# Patient Record
Sex: FEMALE | Race: WHITE | NOT HISPANIC OR LATINO | Employment: FULL TIME | URBAN - METROPOLITAN AREA
[De-identification: names, ages, dates, MRNs, and addresses within clinical notes are randomized per-mention and may not be internally consistent; named-entity substitution may affect disease eponyms.]

---

## 2023-06-14 LAB — CHORIOGONADOTROPIN (MIU/ML) IN SER/PLAS: <2 MIU/ML

## 2023-08-10 ENCOUNTER — HOSPITAL ENCOUNTER (OUTPATIENT)
Dept: DATA CONVERSION | Facility: HOSPITAL | Age: 28
Discharge: HOME | End: 2023-08-10

## 2023-08-10 DIAGNOSIS — F17.210 NICOTINE DEPENDENCE, CIGARETTES, UNCOMPLICATED: ICD-10-CM

## 2023-08-10 DIAGNOSIS — I10 ESSENTIAL (PRIMARY) HYPERTENSION: ICD-10-CM

## 2023-08-10 DIAGNOSIS — R11.0 NAUSEA: ICD-10-CM

## 2023-08-10 DIAGNOSIS — R51.9 HEADACHE, UNSPECIFIED: ICD-10-CM

## 2023-08-10 DIAGNOSIS — Z86.69 PERSONAL HISTORY OF OTHER DISEASES OF THE NERVOUS SYSTEM AND SENSE ORGANS: ICD-10-CM

## 2023-11-30 ENCOUNTER — TELEPHONE (OUTPATIENT)
Dept: OBSTETRICS AND GYNECOLOGY | Facility: CLINIC | Age: 28
End: 2023-11-30
Payer: COMMERCIAL

## 2023-11-30 NOTE — TELEPHONE ENCOUNTER
Est pt called into the office stating she was at [facility] ER on Sunday c/o cyst in bikini line-perineal area on the right side. Pt states she was prescribed bactrim x 5 days 2 tabs bid. Pt states she also has a cyst on the left side now and is long and hard. Pt denies pain, drainage, redness or warmth. States experiences stinging when walking but not all the time. Told pt to apply warm compresses to affected area for relief and a message will be sent to the provider for recommendation. Pt aware possible eval needed to access complaint. Please advise.

## 2023-12-02 NOTE — TELEPHONE ENCOUNTER
The cysts improving on the antibiotic?  She should avoid wearing underwear and pants at night to allow for complete resolution.  Can try Epsom salt sitz bath's for bed.  Use chlorhexidine body wash from over-the-counter pharmacist shower daily.  If she has not improved office follow-up.

## 2023-12-04 ENCOUNTER — TELEPHONE (OUTPATIENT)
Dept: ENDOCRINOLOGY | Facility: CLINIC | Age: 28
End: 2023-12-04

## 2023-12-04 NOTE — TELEPHONE ENCOUNTER
"Spoke with pt, she states LT sided cyst, smaller, RT sided cyst is \"almost gone but feels raw\", pt c/o \"soreness\", pt states she she urinates there is a stinging, and discomfort when she wipes, pt aware of above instructions, pt told to follow up in 1 week   "

## 2023-12-05 NOTE — TELEPHONE ENCOUNTER
Per below recommendations, pt was made aware to follow up with our office in 1 wk. U/A not collected at this time.

## 2023-12-11 ENCOUNTER — APPOINTMENT (OUTPATIENT)
Dept: RADIOLOGY | Facility: HOSPITAL | Age: 28
End: 2023-12-11
Payer: COMMERCIAL

## 2024-10-15 ENCOUNTER — TELEPHONE (OUTPATIENT)
Dept: OBSTETRICS AND GYNECOLOGY | Facility: CLINIC | Age: 29
End: 2024-10-15
Payer: COMMERCIAL

## 2024-10-15 NOTE — TELEPHONE ENCOUNTER
Pt has annual appt tomorrow, but called today stating she is having right sided ovarian pain. She stated that she has had to have cyst surgically removed before and it started with this same kind of pain.

## 2024-10-16 ENCOUNTER — APPOINTMENT (OUTPATIENT)
Dept: OBSTETRICS AND GYNECOLOGY | Facility: CLINIC | Age: 29
End: 2024-10-16
Payer: COMMERCIAL

## 2024-10-28 ENCOUNTER — TELEPHONE (OUTPATIENT)
Dept: OBSTETRICS AND GYNECOLOGY | Facility: CLINIC | Age: 29
End: 2024-10-28
Payer: COMMERCIAL

## 2024-10-31 ENCOUNTER — LAB (OUTPATIENT)
Dept: LAB | Facility: LAB | Age: 29
End: 2024-10-31
Payer: COMMERCIAL

## 2024-10-31 ENCOUNTER — OFFICE VISIT (OUTPATIENT)
Dept: OBSTETRICS AND GYNECOLOGY | Facility: CLINIC | Age: 29
End: 2024-10-31
Payer: COMMERCIAL

## 2024-10-31 VITALS
HEIGHT: 66 IN | SYSTOLIC BLOOD PRESSURE: 138 MMHG | WEIGHT: 219.2 LBS | BODY MASS INDEX: 35.23 KG/M2 | DIASTOLIC BLOOD PRESSURE: 72 MMHG

## 2024-10-31 DIAGNOSIS — Z11.3 SCREENING FOR STD (SEXUALLY TRANSMITTED DISEASE): ICD-10-CM

## 2024-10-31 DIAGNOSIS — R82.90 BAD ODOR OF URINE: ICD-10-CM

## 2024-10-31 DIAGNOSIS — N94.6 DYSMENORRHEA: Primary | ICD-10-CM

## 2024-10-31 LAB
POC APPEARANCE, URINE: CLEAR
POC BILIRUBIN, URINE: ABNORMAL
POC BLOOD, URINE: ABNORMAL
POC COLOR, URINE: YELLOW
POC GLUCOSE, URINE: NEGATIVE MG/DL
POC KETONES, URINE: ABNORMAL MG/DL
POC LEUKOCYTES, URINE: ABNORMAL
POC NITRITE,URINE: POSITIVE
POC PH, URINE: 6 PH
POC PROTEIN, URINE: ABNORMAL MG/DL
POC SPECIFIC GRAVITY, URINE: >=1.03
POC UROBILINOGEN, URINE: 1 EU/DL

## 2024-10-31 PROCEDURE — 87186 SC STD MICRODIL/AGAR DIL: CPT | Mod: WESLAB | Performed by: STUDENT IN AN ORGANIZED HEALTH CARE EDUCATION/TRAINING PROGRAM

## 2024-10-31 PROCEDURE — 3008F BODY MASS INDEX DOCD: CPT | Performed by: STUDENT IN AN ORGANIZED HEALTH CARE EDUCATION/TRAINING PROGRAM

## 2024-10-31 PROCEDURE — 87591 N.GONORRHOEAE DNA AMP PROB: CPT | Mod: WESLAB | Performed by: STUDENT IN AN ORGANIZED HEALTH CARE EDUCATION/TRAINING PROGRAM

## 2024-10-31 PROCEDURE — 81003 URINALYSIS AUTO W/O SCOPE: CPT | Performed by: STUDENT IN AN ORGANIZED HEALTH CARE EDUCATION/TRAINING PROGRAM

## 2024-10-31 PROCEDURE — 99213 OFFICE O/P EST LOW 20 MIN: CPT | Performed by: STUDENT IN AN ORGANIZED HEALTH CARE EDUCATION/TRAINING PROGRAM

## 2024-10-31 PROCEDURE — 86803 HEPATITIS C AB TEST: CPT

## 2024-10-31 PROCEDURE — 87389 HIV-1 AG W/HIV-1&-2 AB AG IA: CPT

## 2024-10-31 PROCEDURE — 36415 COLL VENOUS BLD VENIPUNCTURE: CPT

## 2024-10-31 PROCEDURE — 86780 TREPONEMA PALLIDUM: CPT

## 2024-10-31 PROCEDURE — 87661 TRICHOMONAS VAGINALIS AMPLIF: CPT | Mod: WESLAB | Performed by: STUDENT IN AN ORGANIZED HEALTH CARE EDUCATION/TRAINING PROGRAM

## 2024-10-31 ASSESSMENT — PATIENT HEALTH QUESTIONNAIRE - PHQ9
1. LITTLE INTEREST OR PLEASURE IN DOING THINGS: NOT AT ALL
SUM OF ALL RESPONSES TO PHQ9 QUESTIONS 1 AND 2: 0
2. FEELING DOWN, DEPRESSED OR HOPELESS: NOT AT ALL

## 2024-10-31 ASSESSMENT — PAIN SCALES - GENERAL: PAINLEVEL_OUTOF10: 0-NO PAIN

## 2024-10-31 ASSESSMENT — ENCOUNTER SYMPTOMS
OCCASIONAL FEELINGS OF UNSTEADINESS: 0
LOSS OF SENSATION IN FEET: 0
DEPRESSION: 0

## 2024-11-01 DIAGNOSIS — A59.9 TRICHOMONAS VAGINALIS INFECTION: Primary | ICD-10-CM

## 2024-11-01 LAB
C TRACH RRNA SPEC QL NAA+PROBE: NEGATIVE
HCV AB SER QL: NONREACTIVE
HIV 1+2 AB+HIV1 P24 AG SERPL QL IA: NONREACTIVE
N GONORRHOEA DNA SPEC QL PROBE+SIG AMP: NEGATIVE
T VAGINALIS RRNA SPEC QL NAA+PROBE: POSITIVE
TREPONEMA PALLIDUM IGG+IGM AB [PRESENCE] IN SERUM OR PLASMA BY IMMUNOASSAY: NONREACTIVE

## 2024-11-01 RX ORDER — METRONIDAZOLE 500 MG/1
500 TABLET ORAL 2 TIMES DAILY
Qty: 14 TABLET | Refills: 0 | Status: SHIPPED | OUTPATIENT
Start: 2024-11-01 | End: 2024-11-08

## 2024-11-03 LAB — BACTERIA UR CULT: ABNORMAL

## 2024-11-04 DIAGNOSIS — N30.00 ACUTE CYSTITIS WITHOUT HEMATURIA: Primary | ICD-10-CM

## 2024-11-04 RX ORDER — NITROFURANTOIN 25; 75 MG/1; MG/1
100 CAPSULE ORAL 2 TIMES DAILY
Qty: 10 CAPSULE | Refills: 0 | Status: SHIPPED | OUTPATIENT
Start: 2024-11-04 | End: 2024-11-09

## 2024-11-22 ENCOUNTER — APPOINTMENT (OUTPATIENT)
Dept: OBSTETRICS AND GYNECOLOGY | Facility: CLINIC | Age: 29
End: 2024-11-22
Payer: COMMERCIAL

## 2024-11-22 ENCOUNTER — TELEPHONE (OUTPATIENT)
Dept: OBSTETRICS AND GYNECOLOGY | Facility: CLINIC | Age: 29
End: 2024-11-22
Payer: COMMERCIAL

## 2024-11-22 NOTE — TELEPHONE ENCOUNTER
Patient called this morning, she was not able to get out of her driveway to come in for her appt this morning. She rescheduled for Monday. She did not go to ER yesterday

## 2024-11-22 NOTE — TELEPHONE ENCOUNTER
Patient called on 11/21/2024 stating that she was having a lot of vagina pain, she has appt for this morning but was debating on going to the ER yesterday since the pain was so intense. Advised patient if the vagina pain was that bad she should go to ER. Patient agrees. Advised if she went to ER to call us and we could either keep her apt for today or could move it to next week for a follow up depending on results.

## 2025-01-28 ENCOUNTER — TELEPHONE (OUTPATIENT)
Dept: OBSTETRICS AND GYNECOLOGY | Facility: CLINIC | Age: 30
End: 2025-01-28
Payer: COMMERCIAL

## 2025-01-28 NOTE — TELEPHONE ENCOUNTER
Est pt last seen 10/2024. Nurse attempted to contact pt due to her appointment request message; Bloody pus out right nipple     Nurse lmtco back

## 2025-01-29 NOTE — TELEPHONE ENCOUNTER
Pt returned office call. Pt stated her Rt nipple on Saturday (1/25/2025) she squeezed her nipple and bloody pus came out. Pt stated her breasts have been tender, but denies any more pus or any other symptoms at this time. H/O nipple piecing's 4 years ago.     Pt stated she now has another problem and believes it's a yeast inf. Pt c/o external/internal vaginal itching, burning, and chunky white thick discharge x 1 wk. Pt states h/o yeast inf once and trich. Pt currently sexually active, does not use protection and not on any forms of birth control.     Pt stated gets monthly menses, heavy blood loss, uses pads. LMP; 1/16/2025. Never been pregnant.     Pt appt tomorrow with Dr. Mandujano at 1:45 pm.

## 2025-01-30 ENCOUNTER — APPOINTMENT (OUTPATIENT)
Dept: OBSTETRICS AND GYNECOLOGY | Facility: CLINIC | Age: 30
End: 2025-01-30
Payer: COMMERCIAL

## 2025-01-30 NOTE — TELEPHONE ENCOUNTER
Pt canceled appt 1/30 with SG. For sooner appt since canceling appt today, pt ok with seeing DINESH Toscano 2/4/2205 at 2:00 pm. Pt ok to see other provider. Nurse received approval from  clinical staff prior to booking appointment.

## 2025-06-03 ENCOUNTER — OFFICE VISIT (OUTPATIENT)
Dept: OBSTETRICS AND GYNECOLOGY | Facility: CLINIC | Age: 30
End: 2025-06-03
Payer: COMMERCIAL

## 2025-06-03 ENCOUNTER — LAB (OUTPATIENT)
Dept: LAB | Facility: HOSPITAL | Age: 30
End: 2025-06-03
Payer: COMMERCIAL

## 2025-06-03 VITALS — WEIGHT: 210.6 LBS | SYSTOLIC BLOOD PRESSURE: 124 MMHG | DIASTOLIC BLOOD PRESSURE: 80 MMHG | BODY MASS INDEX: 33.99 KG/M2

## 2025-06-03 DIAGNOSIS — Z01.419 ENCOUNTER FOR ANNUAL ROUTINE GYNECOLOGICAL EXAMINATION: Primary | ICD-10-CM

## 2025-06-03 DIAGNOSIS — Z11.3 ROUTINE SCREENING FOR STI (SEXUALLY TRANSMITTED INFECTION): ICD-10-CM

## 2025-06-03 DIAGNOSIS — Z80.9 FAMILY HISTORY OF CANCER: ICD-10-CM

## 2025-06-03 DIAGNOSIS — Z31.9 DESIRE FOR PREGNANCY: ICD-10-CM

## 2025-06-03 DIAGNOSIS — N89.8 VAGINAL DISCHARGE: ICD-10-CM

## 2025-06-03 DIAGNOSIS — Z20.2 POSSIBLE EXPOSURE TO STD: ICD-10-CM

## 2025-06-03 PROCEDURE — 99395 PREV VISIT EST AGE 18-39: CPT | Performed by: STUDENT IN AN ORGANIZED HEALTH CARE EDUCATION/TRAINING PROGRAM

## 2025-06-03 ASSESSMENT — PAIN SCALES - GENERAL: PAINLEVEL_OUTOF10: 0-NO PAIN

## 2025-06-03 ASSESSMENT — PATIENT HEALTH QUESTIONNAIRE - PHQ9
SUM OF ALL RESPONSES TO PHQ9 QUESTIONS 1 & 2: 0
2. FEELING DOWN, DEPRESSED OR HOPELESS: NOT AT ALL
1. LITTLE INTEREST OR PLEASURE IN DOING THINGS: NOT AT ALL

## 2025-06-03 ASSESSMENT — ENCOUNTER SYMPTOMS
LOSS OF SENSATION IN FEET: 0
OCCASIONAL FEELINGS OF UNSTEADINESS: 0
DEPRESSION: 0

## 2025-06-03 NOTE — PROGRESS NOTES
ANNUAL GYNECOLOGIC VISIT  Subjective   HPI:  Marina Garces is a 30 y.o. female  Patient's last menstrual period was 2025 (exact date). for annual exam.    Complaints:   Interested in conceiving, states she has been trying since age 19. Vaginal odor/vaginal discharge.   Periods: Regular, every 30 days, last for 5 days, requires 6 pads a day  Dysmenorrhea:  Heavy bleeding and cramping with cycles     GYN History:   Menarche age:  12  HPV Vaccine: Completed in   Sexual activity: Yes  Current contraception:   None as she is interested in conceiving  History of abnormal pap:   Denies  Family history: Denies family history of colon, vulvar, vaginal, cervical, uterine, or ovarian cancer.   +Breast cancer in mother and maternal grandmother    Last Pap Smear:  Result Date Procedure Results Follow-ups   2023 CONVERTED GYN CYTOLOGY Pap Smear: NILM        OB History          0    Para   0    Term   0       0    AB   0    Living   0         SAB   0    IAB   0    Ectopic   0    Multiple   0    Live Births   0                Medical History[1]    Surgical History[2]    Prior to Admission medications    Not on File     Social History[3]     Objective   /80   Wt 95.5 kg (210 lb 9.6 oz)   LMP 2025 (Exact Date)   BMI 33.99 kg/m²     Physical Exam:  General: Alert and oriented, in no acute distress.  Psych: Normal affect and mood. Able to answer questions appropriately.   Breast/Axilla: Normal to palpation bilaterally without masses, skin changes, or nipple discharge. No palpable supraclavicular or axillary lymphadenopathy.   Heart: Regular rate.  Lungs: Non labored respirations.  Abdomen: Soft, non-tender, without masses or organomegaly.  GYN:  Speculum:  Vulva: Normal architecture without erythema, masses, or lesions.   Vagina: Normal moist mucosa without lesions, masses, or atrophy. Foul smelling yellow and blood tinged discharge.  Cervix: Normal without erythema, masses, lesions, or  signs of cervicitis.  Bimanual:   Cervix: No cervical motion tenderness.  Uterus: Normal, mobile, non-enlarged, non tender uterus.  Adnexa: Normal without tenderness, nodularity, masses, or lesions.    Assessment/Plan     30 y.o.  , here for annual GYN exam   Assessment & Plan  Encounter for annual routine gynecological examination  -Routine annual  -Pap due 2026  -STI screening: desires all STI testing  -Birth control counseling:  Encouraged barrier methods of contraception for prevention of STIs.   -Reviewed preventative hygiene habits.  -Reviewed health promoting habits: Exercise: 30-60 minutes 3 to 5 days/week. Diet: Healthy diet and drink plenty of water each day.  -Patient to return in 1 year for annual GYN visit, or sooner as clinically indicated        Desire for pregnancy  -States she has been trying without pregnancy since age 19  -Was supposed to have HSG done previously but was never scheduled, order placed for Dr Sweet office for HSG  -GRACIE referral placed for further infertility evaluation   Orders:    [place] hysterosalpingogram; Future    Referral to Reproductive Endocrinology; Future    Family history of cancer  -FH of breast cancer, desires genetic testing  Orders:    my risk; [facility] - Miscellaneous Genetics Test    Routine screening for STI (sexually transmitted infection)  -Desires all STI testing  Orders:    Hepatitis C Antibody; Future    Syphilis Screen with Reflex; Future    HIV 1/2 Antigen/Antibody Screen with Reflex to Confirmation; Future    C. trachomatis / N. gonorrhoeae, Amplified, Urogenital; Future    Vaginal discharge  -Collected vaginitis swabs due to her complaints of odor/discharge; will treat as indicated  Orders:    C. trachomatis / N. gonorrhoeae, Amplified, Urogenital; Future    Vaginitis Gram Stain For Bacterial Vaginosis + Yeast    Trichomonas vaginalis, Amplified; Future      Idalmis Soler MD          [1]   Past Medical History:  Diagnosis Date    Personal history of  other diseases of the female genital tract     History of ovarian cyst    Personal history of other diseases of the musculoskeletal system and connective tissue     History of arthritis    Personal history of other infectious and parasitic diseases     History of gonorrhea    Personal history of other infectious and parasitic diseases     History of chlamydia   [2]   Past Surgical History:  Procedure Laterality Date    FOOT SURGERY  10/14/2019    Foot surgery    KIDNEY STONE SURGERY      OVARIAN CYST SURGERY  10/14/2019    Ovarian cystectomy    TONSILLECTOMY  10/14/2019    Oral surgery    WISDOM TOOTH EXTRACTION     [3]   Social History  Tobacco Use    Smoking status: Every Day     Current packs/day: 0.50     Types: Cigarettes    Smokeless tobacco: Never    Tobacco comments:     10 cigs/ day   Vaping Use    Vaping status: Never Used   Substance Use Topics    Alcohol use: Yes     Comment: social    Drug use: Yes     Types: Marijuana

## 2025-06-04 LAB
C TRACH RRNA SPEC QL NAA+PROBE: NOT DETECTED
N GONORRHOEA RRNA SPEC QL NAA+PROBE: NOT DETECTED
QUEST GC CT AMPLIFIED (ALWAYS MESSAGE): NORMAL
T VAGINALIS RRNA SPEC QL NAA+PROBE: NOT DETECTED

## 2025-06-06 LAB
BV SCORE VAG QL: NORMAL
C TRACH RRNA SPEC QL NAA+PROBE: NOT DETECTED
HCV AB SERPL QL IA: NORMAL
HIV 1+2 AB+HIV1 P24 AG SERPL QL IA: NORMAL
N GONORRHOEA RRNA SPEC QL NAA+PROBE: NOT DETECTED
QUEST GC CT AMPLIFIED (ALWAYS MESSAGE): NORMAL
T PALLIDUM AB SER QL IA: NEGATIVE
T VAGINALIS RRNA SPEC QL NAA+PROBE: NORMAL

## 2025-06-17 LAB
COMMENTS - MP RESULT TYPE: NORMAL
SCAN RESULT: NORMAL